# Patient Record
Sex: FEMALE | Race: WHITE | NOT HISPANIC OR LATINO | Employment: UNEMPLOYED | URBAN - METROPOLITAN AREA
[De-identification: names, ages, dates, MRNs, and addresses within clinical notes are randomized per-mention and may not be internally consistent; named-entity substitution may affect disease eponyms.]

---

## 2023-11-03 ENCOUNTER — HOSPITAL ENCOUNTER (OUTPATIENT)
Dept: RADIOLOGY | Facility: HOSPITAL | Age: 63
Discharge: HOME | End: 2023-11-03
Payer: COMMERCIAL

## 2023-11-03 DIAGNOSIS — R22.2 LOCALIZED SWELLING, MASS AND LUMP, TRUNK: ICD-10-CM

## 2023-11-03 PROCEDURE — 76705 ECHO EXAM OF ABDOMEN: CPT

## 2023-11-03 PROCEDURE — 76705 ECHO EXAM OF ABDOMEN: CPT | Performed by: RADIOLOGY

## 2024-01-11 ENCOUNTER — APPOINTMENT (OUTPATIENT)
Dept: RADIOLOGY | Facility: HOSPITAL | Age: 64
End: 2024-01-11
Payer: COMMERCIAL

## 2024-09-16 ENCOUNTER — HOSPITAL ENCOUNTER (OUTPATIENT)
Dept: RADIOLOGY | Facility: CLINIC | Age: 64
Discharge: HOME | End: 2024-09-16
Payer: COMMERCIAL

## 2024-09-16 DIAGNOSIS — M81.0 AGE-RELATED OSTEOPOROSIS WITHOUT CURRENT PATHOLOGICAL FRACTURE: ICD-10-CM

## 2024-09-16 DIAGNOSIS — E55.9 VITAMIN D DEFICIENCY, UNSPECIFIED: ICD-10-CM

## 2024-09-16 DIAGNOSIS — M54.6 PAIN IN THORACIC SPINE: ICD-10-CM

## 2024-09-16 PROCEDURE — 72072 X-RAY EXAM THORAC SPINE 3VWS: CPT | Performed by: RADIOLOGY

## 2024-09-16 PROCEDURE — 72072 X-RAY EXAM THORAC SPINE 3VWS: CPT

## 2024-12-03 ENCOUNTER — OFFICE VISIT (OUTPATIENT)
Dept: OTOLARYNGOLOGY | Facility: CLINIC | Age: 64
End: 2024-12-03
Payer: COMMERCIAL

## 2024-12-03 VITALS — WEIGHT: 143 LBS | HEIGHT: 63 IN | BODY MASS INDEX: 25.34 KG/M2

## 2024-12-03 DIAGNOSIS — J02.9 SORE THROAT: Primary | ICD-10-CM

## 2024-12-03 PROCEDURE — 1036F TOBACCO NON-USER: CPT | Performed by: PHYSICIAN ASSISTANT

## 2024-12-03 PROCEDURE — 99203 OFFICE O/P NEW LOW 30 MIN: CPT | Performed by: PHYSICIAN ASSISTANT

## 2024-12-03 PROCEDURE — 3008F BODY MASS INDEX DOCD: CPT | Performed by: PHYSICIAN ASSISTANT

## 2024-12-03 RX ORDER — AMOXICILLIN AND CLAVULANATE POTASSIUM 875; 125 MG/1; MG/1
1 TABLET, FILM COATED ORAL 2 TIMES DAILY
Qty: 28 TABLET | Refills: 0 | Status: SHIPPED | OUTPATIENT
Start: 2024-12-03 | End: 2024-12-17

## 2024-12-03 RX ORDER — PREDNISONE 20 MG/1
TABLET ORAL
Qty: 9 TABLET | Refills: 0 | Status: SHIPPED | OUTPATIENT
Start: 2024-12-03

## 2024-12-03 NOTE — PROGRESS NOTES
Sierra Streeter is a 64 y.o. year old female patient with sore throat.  The patient presents to the office today for assessment of sore throat.  Patient presents with right-sided sore throat symptoms for the last 5 weeks.  Patient states that she was diagnosed with a viral suspected illness and has been just taking over-the-counter anti-inflammatories for discomfort but symptoms still present.  Patient reports some pain in the submandibular region on the right.  She states that when palpating over the right tonsil she has discomfort.  She is a former smoker that quit 9 years ago.  She does suffer from a bit of anxiety and had a recent friend/family member diagnosed with esophageal cancer which has made the patient feel a bit more anxious about her symptoms.  She is here today for further assessment and all other ENT issues are negative.         Review of Systems   All other systems reviewed and are negative.        Physical Exam:   General appearance: No acute distress. Normal facies. Symmetric facial movement. No gross lesions of the face are noted.  The external ear structures appear normal. The ear canals patent and the tympanic membranes are intact without evidence of air-fluid levels, retraction, or congenital defects.  Anterior rhinoscopy notes essentially a midline nasal septum. Examination is noted for normal healthy mucosal membranes without any evidence of lesions, polyps, or exudate. The tongue is normally mobile. There are no lesions on the gingiva, buccal, or oral mucosa. There are no oral cavity masses.  Patient does have palpation in the tonsillar region on the right but the tonsil itself is soft and not indurated.  I do not appreciate any asymmetric appearance of the tonsils.  The neck is negative for mass lymphadenopathy.  Patient does have tenderness in the right submandibular region as well as tenderness in the jugulodigastric region but I do not appreciate worrisome lymph node.  The trachea and  parotid are clear. The thyroid bed is grossly unremarkable. The salivary gland structures are grossly unremarkable.    In order to assess the larynx, flexible laryngoscopy was performed based on the patient's history.    After topical anesthesia, a very complete flexible laryngoscopy was performed.  The nasopharynx is clear without evidence of mass or lesion.  This examination reveals a normal appearance to the laryngeal structures including the true cords, false cords, epiglottis, base of tongue, and piriform sinus, except as noted.        Assessment/Plan   1.  Sore throat    Patient seen in the office today for assessment of sore throat which has been persistent for the last 5 weeks.  Patient with tenderness to palpation in the right neck as well as over the right tonsil.  The patient is going to be started on Augmentin and prednisone taper and see me back in 1 month.  If no improvement patient will be set up for dedicated CT imaging of the neck for further assessment.    All questions and concerns were answered and addressed. The patient expresses understanding and will follow up as advised.  Thank you again for allowing us to participate in the care of this patient.

## 2024-12-06 ENCOUNTER — HOSPITAL ENCOUNTER (OUTPATIENT)
Dept: RADIOLOGY | Facility: CLINIC | Age: 64
Discharge: HOME | End: 2024-12-06
Payer: COMMERCIAL

## 2024-12-06 PROCEDURE — 71271 CT THORAX LUNG CANCER SCR C-: CPT

## 2024-12-10 ENCOUNTER — APPOINTMENT (OUTPATIENT)
Dept: OTOLARYNGOLOGY | Facility: CLINIC | Age: 64
End: 2024-12-10
Payer: COMMERCIAL

## 2024-12-10 DIAGNOSIS — E04.9 ENLARGED THYROID: ICD-10-CM

## 2024-12-10 DIAGNOSIS — J02.9 SORE THROAT: ICD-10-CM

## 2024-12-10 DIAGNOSIS — B37.31 VAGINAL YEAST INFECTION: Primary | ICD-10-CM

## 2024-12-10 PROCEDURE — 99213 OFFICE O/P EST LOW 20 MIN: CPT | Performed by: PHYSICIAN ASSISTANT

## 2024-12-10 RX ORDER — FLUCONAZOLE 150 MG/1
150 TABLET ORAL DAILY
Qty: 2 TABLET | Refills: 0 | Status: SHIPPED | OUTPATIENT
Start: 2024-12-10 | End: 2024-12-12

## 2024-12-10 NOTE — PROGRESS NOTES
Sierra Streeter is a 64 y.o. year old female patient with sore throat.  Patient presents to the office today for follow-up on sore throat.  Patient was seen 1 week ago.  The patient did not start the antibiotic or steroid that was prescribed for sore throat symptoms.  Patient has been experiencing sore throat now for 6 weeks.  Patient is a former smoker.  The patient was concerned about taking prednisone as in the past she has had adverse reaction where it has made her feel jittery and does not like the feeling while taking prednisone and states that she tends to get vaginal yeast infections with oral antibiotics and was nervous about taking the medication.  Since her last visit she did have a CT scan of the lungs for lung evaluation which showed benign-appearing nodules in the lung but no other worrisome ENT issues with the exception of mildly enlarged thyroid gland.  Patient denies any recent imaging of the thyroid.  She is still experiencing right sided sore throat but states that this is improving since her last visit.  She is without other ENT concerns.             Physical Exam:   General appearance: No acute distress. Normal facies. Symmetric facial movement. No gross lesions of the face are noted.  The external ear structures appear normal. The ear canals patent and the tympanic membranes are intact without evidence of air-fluid levels, retraction, or congenital defects.  Anterior rhinoscopy notes essentially a midline nasal septum. Examination is noted for normal healthy mucosal membranes without any evidence of lesions, polyps, or exudate. The tongue is normally mobile. There are no lesions on the gingiva, buccal, or oral mucosa. There are no oral cavity masses.  The neck is negative for mass lymphadenopathy. The trachea and parotid are clear. The thyroid bed is grossly unremarkable. The salivary gland structures are grossly unremarkable.    Assessment/Plan   1.  Sore throat  2.  Thyroid  enlargement    Patient seen in the office today for assessment of sore throat symptoms.  Patient had not begun her antibiotics or steroids.  I advised the patient did not take prednisone as she states that she does not like the adverse reactions she receives from taking the medicine.  I did encourage the patient to take the Augmentin and I will send Diflucan for the patient to take to hopefully curb any significant issues with vaginal yeast infection associated with oral antibiotic.  Additionally the patient will be set up for thyroid ultrasound as she had lung scan showing enlarged thyroid.  I will see her back in 1 month for follow-up.

## 2024-12-11 ENCOUNTER — APPOINTMENT (OUTPATIENT)
Dept: RADIOLOGY | Facility: CLINIC | Age: 64
End: 2024-12-11
Payer: COMMERCIAL

## 2024-12-11 ENCOUNTER — HOSPITAL ENCOUNTER (OUTPATIENT)
Dept: RADIOLOGY | Facility: CLINIC | Age: 64
Discharge: HOME | End: 2024-12-11
Payer: COMMERCIAL

## 2024-12-11 DIAGNOSIS — E04.9 ENLARGED THYROID: ICD-10-CM

## 2024-12-11 PROCEDURE — 76536 US EXAM OF HEAD AND NECK: CPT

## 2024-12-11 PROCEDURE — 76536 US EXAM OF HEAD AND NECK: CPT | Performed by: RADIOLOGY

## 2024-12-13 ENCOUNTER — APPOINTMENT (OUTPATIENT)
Dept: OTOLARYNGOLOGY | Facility: CLINIC | Age: 64
End: 2024-12-13
Payer: COMMERCIAL

## 2024-12-16 ENCOUNTER — APPOINTMENT (OUTPATIENT)
Dept: RADIOLOGY | Facility: CLINIC | Age: 64
End: 2024-12-16
Payer: COMMERCIAL

## 2024-12-17 ENCOUNTER — APPOINTMENT (OUTPATIENT)
Dept: OTOLARYNGOLOGY | Facility: CLINIC | Age: 64
End: 2024-12-17
Payer: COMMERCIAL

## 2024-12-19 ENCOUNTER — HOSPITAL ENCOUNTER (OUTPATIENT)
Dept: RADIOLOGY | Facility: CLINIC | Age: 64
End: 2024-12-19
Payer: COMMERCIAL

## 2024-12-20 ENCOUNTER — OFFICE VISIT (OUTPATIENT)
Dept: OTOLARYNGOLOGY | Facility: CLINIC | Age: 64
End: 2024-12-20
Payer: COMMERCIAL

## 2024-12-20 DIAGNOSIS — E04.1 THYROID NODULE: Primary | ICD-10-CM

## 2024-12-20 DIAGNOSIS — K21.9 GASTROESOPHAGEAL REFLUX DISEASE, UNSPECIFIED WHETHER ESOPHAGITIS PRESENT: ICD-10-CM

## 2024-12-20 PROCEDURE — 1036F TOBACCO NON-USER: CPT | Performed by: PHYSICIAN ASSISTANT

## 2024-12-20 PROCEDURE — 99213 OFFICE O/P EST LOW 20 MIN: CPT | Performed by: PHYSICIAN ASSISTANT

## 2024-12-20 NOTE — PROGRESS NOTES
Sierra Streeter is a 64 y.o. year old female patient with Follow-up (Result f/u)     Patient returns to the office today for follow-up on thyroid.  Patient had thyroid ultrasound performed.  Thyroid ultrasound confirmed thyroid nodules with no suspicious lesion being a greater than 2 cm TR 5 nodule on the right-hand side.  The patient had subcentimeter thyroid nodule left side with TR4 characteristics.  The patient is here today to discuss the results and discuss further plan.  Patient also with concerns over gastroesophageal reflux disease and would like a referral to gastroenterology.  All other ENT concerns are negative.      Review of Systems   All other systems reviewed and are negative.        Physical Exam:   General appearance: No acute distress. Normal facies. Symmetric facial movement. No gross lesions of the face are noted.  The external ear structures appear normal. The ear canals patent and the tympanic membranes are intact without evidence of air-fluid levels, retraction, or congenital defects.  Anterior rhinoscopy notes essentially a midline nasal septum. Examination is noted for normal healthy mucosal membranes without any evidence of lesions, polyps, or exudate. The tongue is normally mobile. There are no lesions on the gingiva, buccal, or oral mucosa. There are no oral cavity masses.  The neck is negative for mass lymphadenopathy. The trachea and parotid are clear. The thyroid bed is grossly unremarkable. The salivary gland structures are grossly unremarkable.    Assessment/Plan   1.  Thyroid nodule  2.  GERD    Patient seen in the office today for follow-up.  Patient with T-R 5 nodule right side of the thyroid.  The patient at this time is going to be set up for ultrasound-guided biopsy of the suspicious lesion.  I will see the patient back following to review.  Patient will also be given referral for gastroenterology for reflux.  We will talk following the ultrasound.    All questions and concerns  were answered and addressed. The patient expresses understanding and will follow up as advised.    Thank you again for allowing us to participate in the care of this patient.

## 2024-12-30 ENCOUNTER — APPOINTMENT (OUTPATIENT)
Dept: RADIOLOGY | Facility: CLINIC | Age: 64
End: 2024-12-30
Payer: COMMERCIAL

## 2025-01-03 DIAGNOSIS — B37.31 VAGINAL YEAST INFECTION: Primary | ICD-10-CM

## 2025-01-03 RX ORDER — FLUCONAZOLE 150 MG/1
150 TABLET ORAL DAILY
Qty: 3 TABLET | Refills: 0 | Status: SHIPPED | OUTPATIENT
Start: 2025-01-03 | End: 2025-01-06

## 2025-01-06 ENCOUNTER — APPOINTMENT (OUTPATIENT)
Dept: OTOLARYNGOLOGY | Facility: CLINIC | Age: 65
End: 2025-01-06
Payer: COMMERCIAL

## 2025-01-06 ENCOUNTER — APPOINTMENT (OUTPATIENT)
Dept: RADIOLOGY | Facility: HOSPITAL | Age: 65
End: 2025-01-06
Payer: COMMERCIAL

## 2025-01-14 DIAGNOSIS — E04.1 THYROID NODULE: Primary | ICD-10-CM

## 2025-01-16 ENCOUNTER — APPOINTMENT (OUTPATIENT)
Dept: OTOLARYNGOLOGY | Facility: CLINIC | Age: 65
End: 2025-01-16
Payer: COMMERCIAL

## 2025-01-16 VITALS — HEIGHT: 63 IN | BODY MASS INDEX: 25.34 KG/M2 | WEIGHT: 143 LBS

## 2025-01-16 DIAGNOSIS — E04.1 THYROID NODULE: Primary | ICD-10-CM

## 2025-01-16 PROCEDURE — 1036F TOBACCO NON-USER: CPT | Performed by: PHYSICIAN ASSISTANT

## 2025-01-16 PROCEDURE — 3008F BODY MASS INDEX DOCD: CPT | Performed by: PHYSICIAN ASSISTANT

## 2025-01-16 PROCEDURE — 99212 OFFICE O/P EST SF 10 MIN: CPT | Performed by: PHYSICIAN ASSISTANT

## 2025-01-16 RX ORDER — LISINOPRIL 5 MG/1
5 TABLET ORAL DAILY
COMMUNITY

## 2025-01-16 NOTE — PROGRESS NOTES
Sierra Streeter is a 64 y.o. year old female patient with Follow-up       The patient returns today to further discuss results of thyroid ultrasound and biopsy.  Biopsy showed lesion of undetermined significance and is being sent off for genetic testing through [facility].  The patient has been scheduled for follow-up with my colleague Dr. Priscilla Ibrahim for further assessment and discussion regarding the patient's suspicious thyroid nodules.  Patient has a known TR 5 nodule on the right and TR 4 nodule left.  The right side was biopsied recently.  The patient is otherwise well and denies other ENT concerns at today's visit.  Sore throat symptoms have improved and the patient will work on follow-up for gastroenterology due to reflux.  All other ENT issues are negative.      Review of Systems   All other systems reviewed and are negative.        Physical Exam:   General appearance: No acute distress. Normal facies. Symmetric facial movement. No gross lesions of the face are noted.  The external ear structures appear normal. The ear canals patent and the tympanic membranes are intact without evidence of air-fluid levels, retraction, or congenital defects.  Anterior rhinoscopy notes essentially a midline nasal septum. Examination is noted for normal healthy mucosal membranes without any evidence of lesions, polyps, or exudate. The tongue is normally mobile. There are no lesions on the gingiva, buccal, or oral mucosa. There are no oral cavity masses.  The neck is negative for mass lymphadenopathy. The trachea and parotid are clear. The thyroid bed is grossly unremarkable. The salivary gland structures are grossly unremarkable.      Assessment/Plan     1.  Thyroid nodules  The patient was seen today for further discussion regarding thyroid.  The patient and I had detailed discussion regarding the neck steps in her further assessment of suspicious thyroid nodules.  The patient expresses understanding and  will follow-up as scheduled with Dr. Ibrahim in early February.  Certainly I can see her back as needed for other ENT related concerns.

## 2025-01-20 ENCOUNTER — APPOINTMENT (OUTPATIENT)
Dept: OTOLARYNGOLOGY | Facility: CLINIC | Age: 65
End: 2025-01-20
Payer: COMMERCIAL

## 2025-02-05 ENCOUNTER — APPOINTMENT (OUTPATIENT)
Dept: OTOLARYNGOLOGY | Facility: HOSPITAL | Age: 65
End: 2025-02-05
Payer: COMMERCIAL

## 2025-02-11 NOTE — PROGRESS NOTES
No chief complaint on file.    MIL Streeter is a 64 y.o. female referred to me today by Quincy Hardin P* for further evaluation and treatment of thyroid nodules. US showed The right lobe of the thyroid measures 2.2 cm x 1.5 cm x 5.6 cm. There is a solid and cystic ovoid nodule. The solid component is hypoechoic measuring 13 x 6 x 9 mm. This is TR 3. In addition there is a lobulated hypoechoic solid nodule with question of associated punctate calcifications measuring 21 x 13 x 15 mm. This is TR 5.  The left lobe of the thyroid measures 1.5 cm x 1.2 cm x 4.1 cm. There is a solid hypoechoic well-defined nodule measuring 8 x 6 x 8 mm and TR 4.  FNA was done and returned Atypia of undetermined significance. Was sent for genetic testing.        Social History  She reports that she has quit smoking. Her smoking use included cigarettes. She has never used smokeless tobacco. She reports that she does not currently use alcohol. She reports that she does not use drugs.    PMH  She has a past medical history of Personal history of other diseases of the nervous system and sense organs (09/11/2019).     PSH  She has a past surgical history that includes Other surgical history (09/11/2019).    ROS  Review of Systems     PE  ENT Physical Exam     Procedures     ASSESSMENT AND PLAN  Problem List Items Addressed This Visit    None           By signing my name below, I, Ellis Browning, attest that this documentation has been prepared under the direction and in the presence of Dr. Priscilla Ibrahim MD.     All medical record entries made by the [facility] were at my direction and personally dictated by me, Dr. Priscilla Ibrahim. I have reviewed the chart and agree that the record accurately reflects my personal performance of the history, physical exam, discussion and plan.        Musculoskeletal:  Negative for neck pain.   Hematological:  Negative for adenopathy.        PE  ENT Physical Exam  Constitutional  Appearance: patient appears well-developed,  Communication/Voice: communication appropriate for developmental age;  Head and Face  Appearance: head appears normal and face appears normal;  Salivary: glands normal;  Ear  Auricles: right auricle normal; left auricle normal;  Ear Canals: right ear canal normal; left ear canal normal;  Tympanic Membranes: right tympanic membrane normal; left tympanic membrane normal;  Nose  Internal Nose: nasal mucosa normal; septum normal;  Oral Cavity/Oropharynx  Gums: gingiva normal;  Tongue: normal;  Oral mucosa: normal;  Neck  Neck: neck normal; normal trachea;  Thyroid: nodules present on right lobe; Thyroid comments: R>L enlargement  Neck comments: No lymphadenopathy  Respiratory  Inspection: breathing unlabored;  Cardiovascular  Inspection: extremities are warm and well perfused;  Neurovestibular  Mental Status: alert and oriented;  Psychiatric: mood normal; affect is appropriate;  Cranial Nerves: cranial nerves intact;       Procedures   PROCEDURE NOTE:  Recommended flexible nasopharyngoscopy & laryngoscopy.  Risks, benefits, personnel and alternatives were explained.  The patient wished to proceed.  S/he was re-identified.  PROCEDURE:  Flexible nasopharyngoscopy and laryngoscopy  PREOPERATIVE DIAGNOSIS: Thyroid nodule  POSTOPERATIVE DIAGNOSIS: Same as above, normal VC mobility  INDICATIONS: Inability to tolerate mirror exam  ANESTHESIA: 4% lidocaine and 0.5% phenylephrine  PROCEDURE:  With the patient sitting upright topical anesthesia and vasoconstriction was applied with spray to the right side(s) of the nose.  After waiting an appropriate period of time for anesthesia/vasoconstriction to become effective, a flexible laryngoscope was passed through the right side(s) of the nose.  Nasopharynx, oropharynx, hypopharynx and larynx were  examined.  FINDINGS:  The nasopharynx and oropharynx were normal without any lesions or masses visualized.  No masses or lesions were visualized at the base of tongue, vallecula, epiglottis, aryepiglottic folds, pyriform sinuses, and lateral pharyngeal walls.  True vocal fold movement was normal.  The patient's airway was widely patent with no evidence of obstruction.  Patient tolerated the procedure well, and there were no complications.     ASSESSMENT AND PLAN  Problem List Items Addressed This Visit       Thyroid nodule    Current Assessment & Plan     Right thyroid nodule TR5  Reviewed thyroid US - which shows multiple nodules right 1.3cm TR3, right 2.1cm TR5 and left 0.8cm TR4  The right 2.1cm thyroid nodule was biopsied with +atypia.  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  I also reviewed the need for potential lifelong thyroid hormone supplementation.            Relevant Orders    Case Request Operating Room: THYROIDECTOMY (Completed)      Priscilla Ibrahim MD    Head & Neck Surgical Oncology & Reconstruction  Department of Otolaryngology - Head and Neck Surgery       By signing my name below, I, Ellis Browning, attest that this documentation has been prepared under the direction and in the presence of Dr. Priscilla Ibrahim MD.     All medical record entries made by the [facility] were at my direction and personally dictated by me, Dr. Priscilla Ibrahim. I have reviewed the chart and agree that the record accurately reflects my personal  performance of the history, physical exam, discussion and plan.

## 2025-02-12 ENCOUNTER — OFFICE VISIT (OUTPATIENT)
Dept: OTOLARYNGOLOGY | Facility: HOSPITAL | Age: 65
End: 2025-02-12
Payer: COMMERCIAL

## 2025-02-12 ENCOUNTER — HOSPITAL ENCOUNTER (OUTPATIENT)
Facility: HOSPITAL | Age: 65
Setting detail: OUTPATIENT SURGERY
End: 2025-02-12
Attending: OTOLARYNGOLOGY | Admitting: OTOLARYNGOLOGY
Payer: COMMERCIAL

## 2025-02-12 VITALS — WEIGHT: 139 LBS | BODY MASS INDEX: 24.63 KG/M2 | HEIGHT: 63 IN | TEMPERATURE: 97.7 F

## 2025-02-12 DIAGNOSIS — E04.1 THYROID NODULE: ICD-10-CM

## 2025-02-12 PROCEDURE — 99214 OFFICE O/P EST MOD 30 MIN: CPT | Mod: 25 | Performed by: OTOLARYNGOLOGY

## 2025-02-12 PROCEDURE — 92511 NASOPHARYNGOSCOPY: CPT | Performed by: OTOLARYNGOLOGY

## 2025-02-12 PROCEDURE — 3008F BODY MASS INDEX DOCD: CPT | Performed by: OTOLARYNGOLOGY

## 2025-02-12 PROCEDURE — 99244 OFF/OP CNSLTJ NEW/EST MOD 40: CPT | Performed by: OTOLARYNGOLOGY

## 2025-02-12 ASSESSMENT — PATIENT HEALTH QUESTIONNAIRE - PHQ9
4. FEELING TIRED OR HAVING LITTLE ENERGY: NEARLY EVERY DAY
8. MOVING OR SPEAKING SO SLOWLY THAT OTHER PEOPLE COULD HAVE NOTICED. OR THE OPPOSITE, BEING SO FIGETY OR RESTLESS THAT YOU HAVE BEEN MOVING AROUND A LOT MORE THAN USUAL: MORE THAN HALF THE DAYS
10. IF YOU CHECKED OFF ANY PROBLEMS, HOW DIFFICULT HAVE THESE PROBLEMS MADE IT FOR YOU TO DO YOUR WORK, TAKE CARE OF THINGS AT HOME, OR GET ALONG WITH OTHER PEOPLE: VERY DIFFICULT
SUM OF ALL RESPONSES TO PHQ QUESTIONS 1-9: 12
2. FEELING DOWN, DEPRESSED OR HOPELESS: NEARLY EVERY DAY
6. FEELING BAD ABOUT YOURSELF - OR THAT YOU ARE A FAILURE OR HAVE LET YOURSELF OR YOUR FAMILY DOWN: MORE THAN HALF THE DAYS
5. POOR APPETITE OR OVEREATING: NOT AT ALL
1. LITTLE INTEREST OR PLEASURE IN DOING THINGS: MORE THAN HALF THE DAYS
7. TROUBLE CONCENTRATING ON THINGS, SUCH AS READING THE NEWSPAPER OR WATCHING TELEVISION: NOT AT ALL
3. TROUBLE FALLING OR STAYING ASLEEP: NOT AT ALL
SUM OF ALL RESPONSES TO PHQ9 QUESTIONS 1 & 2: 5
9. THOUGHTS THAT YOU WOULD BE BETTER OFF DEAD, OR OF HURTING YOURSELF: NOT AT ALL

## 2025-02-12 ASSESSMENT — PAIN SCALES - GENERAL: PAINLEVEL_OUTOF10: 0-NO PAIN

## 2025-02-13 ENCOUNTER — TELEPHONE (OUTPATIENT)
Dept: OTOLARYNGOLOGY | Facility: HOSPITAL | Age: 65
End: 2025-02-13
Payer: COMMERCIAL

## 2025-02-13 DIAGNOSIS — E04.9 ENLARGED THYROID: ICD-10-CM

## 2025-02-13 DIAGNOSIS — Z01.818 PRE-OP EVALUATION: ICD-10-CM

## 2025-03-02 ASSESSMENT — ENCOUNTER SYMPTOMS
NECK PAIN: 0
UNEXPECTED WEIGHT CHANGE: 0
FEVER: 0
CHILLS: 0
VOICE CHANGE: 0
FATIGUE: 0
STRIDOR: 0
SHORTNESS OF BREATH: 0
FACIAL SWELLING: 0
ADENOPATHY: 0
SORE THROAT: 0
VOMITING: 0
COUGH: 0
NAUSEA: 0
APPETITE CHANGE: 0
TROUBLE SWALLOWING: 0

## 2025-03-03 NOTE — ASSESSMENT & PLAN NOTE
Right thyroid nodule TR5  Reviewed thyroid US - which shows multiple nodules right 1.3cm TR3, right 2.1cm TR5 and left 0.8cm TR4  The right 2.1cm thyroid nodule was biopsied with +atypia.  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  I also reviewed the need for potential lifelong thyroid hormone supplementation.

## 2025-03-26 ENCOUNTER — APPOINTMENT (OUTPATIENT)
Dept: OTOLARYNGOLOGY | Facility: HOSPITAL | Age: 65
End: 2025-03-26
Payer: COMMERCIAL

## 2025-04-28 ENCOUNTER — APPOINTMENT (OUTPATIENT)
Dept: OTOLARYNGOLOGY | Facility: CLINIC | Age: 65
End: 2025-04-28
Payer: COMMERCIAL

## 2025-05-01 ENCOUNTER — TELEPHONE (OUTPATIENT)
Dept: OTOLARYNGOLOGY | Facility: HOSPITAL | Age: 65
End: 2025-05-01
Payer: COMMERCIAL

## 2025-05-01 DIAGNOSIS — E04.9 ENLARGED THYROID: ICD-10-CM

## 2025-05-01 NOTE — TELEPHONE ENCOUNTER
I called and personally spoke with the patient. She reports she still can't decide about where she wants to have surgery. She met with endocrine surgery at [facility]. She is trying to decide if she should have surgery there or at . She was told that her thyroid nodule grew by 2mm in 5 months. She has not had a recent dedicated US. Will order an updated thyroid US. Patient will have this done on Monday. We will call her with the results. She will plan to schedule surgery with us at that time.

## 2025-05-05 ENCOUNTER — HOSPITAL ENCOUNTER (OUTPATIENT)
Dept: RADIOLOGY | Facility: CLINIC | Age: 65
Discharge: HOME | End: 2025-05-05
Payer: COMMERCIAL

## 2025-05-05 DIAGNOSIS — E04.9 ENLARGED THYROID: ICD-10-CM

## 2025-05-05 PROCEDURE — 76536 US EXAM OF HEAD AND NECK: CPT

## 2025-05-05 PROCEDURE — 76536 US EXAM OF HEAD AND NECK: CPT | Performed by: RADIOLOGY

## 2025-05-07 ENCOUNTER — PATIENT MESSAGE (OUTPATIENT)
Dept: OTOLARYNGOLOGY | Facility: HOSPITAL | Age: 65
End: 2025-05-07
Payer: COMMERCIAL

## 2025-05-14 ENCOUNTER — TELEPHONE (OUTPATIENT)
Dept: OTOLARYNGOLOGY | Facility: HOSPITAL | Age: 65
End: 2025-05-14
Payer: COMMERCIAL

## 2025-05-14 NOTE — TELEPHONE ENCOUNTER
----- Message -----  From: Edith Salcedo  Sent: 5/13/2025   3:45 PM EDT  To: Racquel Rojas RN; Luh Song RN  Subject: call back                                        Patient would like a call back regarding her ultrasound results and her next steps    VM left with patient to discuss

## 2025-05-15 ENCOUNTER — TELEPHONE (OUTPATIENT)
Dept: OTOLARYNGOLOGY | Facility: HOSPITAL | Age: 65
End: 2025-05-15
Payer: COMMERCIAL

## 2025-05-15 NOTE — TELEPHONE ENCOUNTER
I called and personally spoke with the patient. She reports she still has not decided on if and where she wants to have thyroid surgery. She is very anxious. She was told she could have very aggressive thyroid cancer. We discussed this is unlikely as her thyroid nodules are not growing based on recent US. She would like to come in person to discuss her US. She will call the office tomorrow once she has her work schedule to make an apt. she was agreeable and understanding of the plan. she will reach out to the office with any other questions or concerns.